# Patient Record
Sex: FEMALE | Race: BLACK OR AFRICAN AMERICAN
[De-identification: names, ages, dates, MRNs, and addresses within clinical notes are randomized per-mention and may not be internally consistent; named-entity substitution may affect disease eponyms.]

---

## 2021-08-19 PROBLEM — Z00.00 ENCOUNTER FOR PREVENTIVE HEALTH EXAMINATION: Status: ACTIVE | Noted: 2021-08-19

## 2021-08-20 ENCOUNTER — APPOINTMENT (OUTPATIENT)
Dept: PEDIATRIC ORTHOPEDIC SURGERY | Facility: CLINIC | Age: 71
End: 2021-08-20
Payer: MEDICARE

## 2021-08-20 VITALS — WEIGHT: 130 LBS | BODY MASS INDEX: 21.66 KG/M2 | HEIGHT: 65 IN | TEMPERATURE: 97.3 F

## 2021-08-20 DIAGNOSIS — Z80.9 FAMILY HISTORY OF MALIGNANT NEOPLASM, UNSPECIFIED: ICD-10-CM

## 2021-08-20 DIAGNOSIS — Z82.49 FAMILY HISTORY OF ISCHEMIC HEART DISEASE AND OTHER DISEASES OF THE CIRCULATORY SYSTEM: ICD-10-CM

## 2021-08-20 DIAGNOSIS — Z83.3 FAMILY HISTORY OF DIABETES MELLITUS: ICD-10-CM

## 2021-08-20 DIAGNOSIS — S80.02XA CONTUSION OF LEFT KNEE, INITIAL ENCOUNTER: ICD-10-CM

## 2021-08-20 DIAGNOSIS — Z87.39 PERSONAL HISTORY OF OTHER DISEASES OF THE MUSCULOSKELETAL SYSTEM AND CONNECTIVE TISSUE: ICD-10-CM

## 2021-08-20 PROCEDURE — 73560 X-RAY EXAM OF KNEE 1 OR 2: CPT

## 2021-08-20 PROCEDURE — 99212 OFFICE O/P EST SF 10 MIN: CPT

## 2021-08-20 RX ORDER — NABUMETONE 750 MG/1
750 TABLET, FILM COATED ORAL TWICE DAILY
Qty: 30 | Refills: 2 | Status: ACTIVE | COMMUNITY
Start: 2021-08-20 | End: 1900-01-01

## 2021-08-20 NOTE — ASSESSMENT
[FreeTextEntry1] : Contusion left knee\par \par The patient has been given a prescription for Relafen.  She will return on a as needed basis.\par \par This encounter took 10 minutes

## 2021-08-20 NOTE — HISTORY OF PRESENT ILLNESS
[de-identified] : This 71-year-old female is here for evaluation of a 5-day history of left knee pain in the superior aspect of the medial femoral condyle.  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  She has had no previous history of knee pain.

## 2021-08-20 NOTE — PHYSICAL EXAM
[de-identified] : On physical examination there is a full range of motion of the left hip knee ankle and subtalar joints.  There is no medial or lateral joint line tenderness.  There is tenderness though in the superomedial aspect of the medial femoral condyle.  There is no effusion and no varus valgus or AP instability. [de-identified] : X-ray evaluation of the left knee on 8/20/2021 (AP and lateral views) reveals no obvious abnormalities.  Indication for x-ray:

## 2023-08-24 ENCOUNTER — APPOINTMENT (OUTPATIENT)
Dept: PEDIATRIC ORTHOPEDIC SURGERY | Facility: CLINIC | Age: 73
End: 2023-08-24
Payer: MEDICARE

## 2023-08-24 VITALS
BODY MASS INDEX: 20.89 KG/M2 | SYSTOLIC BLOOD PRESSURE: 145 MMHG | HEIGHT: 66 IN | DIASTOLIC BLOOD PRESSURE: 80 MMHG | WEIGHT: 130 LBS | TEMPERATURE: 96.6 F

## 2023-08-24 DIAGNOSIS — M19.011 PRIMARY OSTEOARTHRITIS, RIGHT SHOULDER: ICD-10-CM

## 2023-08-24 PROCEDURE — 99212 OFFICE O/P EST SF 10 MIN: CPT

## 2023-08-24 RX ORDER — DICLOFENAC SODIUM 1% 10 MG/G
1 GEL TOPICAL DAILY
Qty: 1 | Refills: 2 | Status: ACTIVE | COMMUNITY
Start: 2023-08-24 | End: 1900-01-01

## 2023-08-25 PROBLEM — M19.011 DJD OF RIGHT SHOULDER: Status: ACTIVE | Noted: 2023-08-25

## 2023-08-25 NOTE — HISTORY OF PRESENT ILLNESS
[FreeTextEntry1] : This 73-year-old female is here for evaluation of right shoulder pain.  There has been no history of injury.  She has noted crepitus in the right shoulder on range of motion.

## 2023-08-25 NOTE — PHYSICAL EXAM
[FreeTextEntry1] : On physical examination there is crepitus on range of motion of the right shoulder.  There is tenderness in the anterior deltoid bursa and bicipital groove.  There is no evidence of instability.

## 2023-08-25 NOTE — ASSESSMENT
[FreeTextEntry1] : DJD right shoulder  The patient does not want to take anti-inflammatory medication or have a cortisone injection she will try Voltaren gel but she has been made aware that if she is not happy with the situation she should return for cortisone injection.

## 2024-09-26 ENCOUNTER — APPOINTMENT (OUTPATIENT)
Dept: PEDIATRIC ORTHOPEDIC SURGERY | Facility: CLINIC | Age: 74
End: 2024-09-26
Payer: MEDICARE

## 2024-09-26 VITALS
BODY MASS INDEX: 21.66 KG/M2 | SYSTOLIC BLOOD PRESSURE: 160 MMHG | WEIGHT: 130 LBS | DIASTOLIC BLOOD PRESSURE: 78 MMHG | HEART RATE: 75 BPM | HEIGHT: 65 IN | TEMPERATURE: 96.6 F

## 2024-09-26 DIAGNOSIS — M19.011 PRIMARY OSTEOARTHRITIS, RIGHT SHOULDER: ICD-10-CM

## 2024-09-26 PROCEDURE — 99212 OFFICE O/P EST SF 10 MIN: CPT

## 2024-09-27 NOTE — PHYSICAL EXAM
[de-identified] : On physical examination the patient has mild tenderness in the anterior aspect of the right shoulder.  Attempts at forward flexion and abduction past 130 degrees causes some discomfort.  She has full internal and external rotation of both shoulders.

## 2024-09-27 NOTE — ASSESSMENT
[FreeTextEntry1] : DJD right shoulder  I have recommended continued symptomatic treatment.  She will return on a as needed basis.

## 2024-09-27 NOTE — HISTORY OF PRESENT ILLNESS
[de-identified] : This 74-year-old female returns for reevaluation of DJD of the right shoulder.  She occasionally has pain on the left shoulder.  She states though that she is significantly improved after previous treatment.  She does complain of pain when trying to achieve full forward flexion and abduction of the shoulder.